# Patient Record
Sex: MALE | Race: OTHER | HISPANIC OR LATINO | Employment: OTHER | URBAN - METROPOLITAN AREA
[De-identification: names, ages, dates, MRNs, and addresses within clinical notes are randomized per-mention and may not be internally consistent; named-entity substitution may affect disease eponyms.]

---

## 2020-11-08 ENCOUNTER — HOSPITAL ENCOUNTER (OUTPATIENT)
Facility: HOSPITAL | Age: 40
Discharge: HOME | End: 2020-11-09
Attending: EMERGENCY MEDICINE | Admitting: FAMILY MEDICINE

## 2020-11-08 DIAGNOSIS — R79.89 POSITIVE D-DIMER: ICD-10-CM

## 2020-11-08 DIAGNOSIS — E11.9 TYPE 2 DIABETES MELLITUS WITHOUT COMPLICATION, WITHOUT LONG-TERM CURRENT USE OF INSULIN: ICD-10-CM

## 2020-11-08 DIAGNOSIS — R07.9 CHEST PAIN, UNSPECIFIED TYPE: ICD-10-CM

## 2020-11-08 DIAGNOSIS — I21.4 NSTEMI (NON-ST ELEVATED MYOCARDIAL INFARCTION): ICD-10-CM

## 2020-11-08 DIAGNOSIS — R79.89 ELEVATED TROPONIN: Primary | ICD-10-CM

## 2020-11-08 DIAGNOSIS — R79.89 POSITIVE D DIMER: ICD-10-CM

## 2020-11-08 DIAGNOSIS — R07.9 CHEST PAIN: ICD-10-CM

## 2020-11-08 LAB
ALBUMIN SERPL BCP-MCNC: 4 G/DL (ref 3.5–5.2)
ALP SERPL-CCNC: 262 U/L (ref 55–135)
ALT SERPL W/O P-5'-P-CCNC: 122 U/L (ref 10–44)
ANION GAP SERPL CALC-SCNC: 14 MMOL/L (ref 8–16)
AST SERPL-CCNC: 200 U/L (ref 10–40)
BILIRUB SERPL-MCNC: 0.9 MG/DL (ref 0.1–1)
BUN SERPL-MCNC: 6 MG/DL (ref 6–20)
CALCIUM SERPL-MCNC: 9.2 MG/DL (ref 8.7–10.5)
CHLORIDE SERPL-SCNC: 103 MMOL/L (ref 95–110)
CO2 SERPL-SCNC: 21 MMOL/L (ref 23–29)
CREAT SERPL-MCNC: 0.9 MG/DL (ref 0.5–1.4)
EST. GFR  (AFRICAN AMERICAN): >60 ML/MIN/1.73 M^2
EST. GFR  (NON AFRICAN AMERICAN): >60 ML/MIN/1.73 M^2
GLUCOSE SERPL-MCNC: 226 MG/DL (ref 70–110)
POTASSIUM SERPL-SCNC: 3 MMOL/L (ref 3.5–5.1)
PROT SERPL-MCNC: 8.2 G/DL (ref 6–8.4)
SODIUM SERPL-SCNC: 138 MMOL/L (ref 136–145)
TROPONIN I SERPL DL<=0.01 NG/ML-MCNC: 0.01 NG/ML (ref 0–0.03)

## 2020-11-08 PROCEDURE — 84484 ASSAY OF TROPONIN QUANT: CPT

## 2020-11-08 PROCEDURE — 99285 EMERGENCY DEPT VISIT HI MDM: CPT | Mod: 25

## 2020-11-08 PROCEDURE — 80053 COMPREHEN METABOLIC PANEL: CPT

## 2020-11-08 PROCEDURE — 93010 ELECTROCARDIOGRAM REPORT: CPT | Mod: ,,, | Performed by: INTERNAL MEDICINE

## 2020-11-08 PROCEDURE — 93005 ELECTROCARDIOGRAM TRACING: CPT

## 2020-11-08 PROCEDURE — 25000003 PHARM REV CODE 250: Performed by: EMERGENCY MEDICINE

## 2020-11-08 PROCEDURE — 85379 FIBRIN DEGRADATION QUANT: CPT

## 2020-11-08 PROCEDURE — 93010 EKG 12-LEAD: ICD-10-PCS | Mod: ,,, | Performed by: INTERNAL MEDICINE

## 2020-11-08 PROCEDURE — 85025 COMPLETE CBC W/AUTO DIFF WBC: CPT

## 2020-11-08 PROCEDURE — 96374 THER/PROPH/DIAG INJ IV PUSH: CPT

## 2020-11-08 RX ORDER — ASPIRIN 325 MG
325 TABLET ORAL
Status: COMPLETED | OUTPATIENT
Start: 2020-11-08 | End: 2020-11-08

## 2020-11-08 RX ORDER — KETOROLAC TROMETHAMINE 30 MG/ML
15 INJECTION, SOLUTION INTRAMUSCULAR; INTRAVENOUS
Status: COMPLETED | OUTPATIENT
Start: 2020-11-08 | End: 2020-11-09

## 2020-11-08 RX ORDER — HYDROCODONE BITARTRATE AND ACETAMINOPHEN 5; 325 MG/1; MG/1
1 TABLET ORAL
Status: COMPLETED | OUTPATIENT
Start: 2020-11-08 | End: 2020-11-09

## 2020-11-08 RX ADMIN — ASPIRIN 325 MG ORAL TABLET 325 MG: 325 PILL ORAL at 11:11

## 2020-11-09 VITALS
DIASTOLIC BLOOD PRESSURE: 91 MMHG | OXYGEN SATURATION: 100 % | TEMPERATURE: 98 F | HEIGHT: 70 IN | BODY MASS INDEX: 27.2 KG/M2 | HEART RATE: 88 BPM | RESPIRATION RATE: 16 BRPM | WEIGHT: 190 LBS | SYSTOLIC BLOOD PRESSURE: 160 MMHG

## 2020-11-09 PROBLEM — R07.9 CHEST PAIN: Status: ACTIVE | Noted: 2020-11-09

## 2020-11-09 PROBLEM — I21.4 NSTEMI (NON-ST ELEVATED MYOCARDIAL INFARCTION): Status: ACTIVE | Noted: 2020-11-09

## 2020-11-09 PROBLEM — R79.89 POSITIVE D DIMER: Status: ACTIVE | Noted: 2020-11-09

## 2020-11-09 PROBLEM — R79.89 ELEVATED TROPONIN: Status: ACTIVE | Noted: 2020-11-09

## 2020-11-09 PROBLEM — E11.9 TYPE 2 DIABETES MELLITUS WITHOUT COMPLICATION, WITHOUT LONG-TERM CURRENT USE OF INSULIN: Status: ACTIVE | Noted: 2020-11-09

## 2020-11-09 LAB
ANISOCYTOSIS BLD QL SMEAR: SLIGHT
BASOPHILS # BLD AUTO: 0.05 K/UL (ref 0–0.2)
BASOPHILS NFR BLD: 0.6 % (ref 0–1.9)
CTP QC/QA: YES
D DIMER PPP IA.FEU-MCNC: 0.64 MG/L FEU
DIFFERENTIAL METHOD: ABNORMAL
EOSINOPHIL # BLD AUTO: 0.2 K/UL (ref 0–0.5)
EOSINOPHIL NFR BLD: 2.3 % (ref 0–8)
ERYTHROCYTE [DISTWIDTH] IN BLOOD BY AUTOMATED COUNT: 12.9 % (ref 11.5–14.5)
ESTIMATED AVG GLUCOSE: 171 MG/DL (ref 68–131)
ESTIMATED AVG GLUCOSE: 171 MG/DL (ref 68–131)
ETHANOL SERPL-MCNC: <10 MG/DL
GIANT PLATELETS BLD QL SMEAR: PRESENT
HBA1C MFR BLD HPLC: 7.6 % (ref 4–5.6)
HBA1C MFR BLD HPLC: 7.6 % (ref 4–5.6)
HCT VFR BLD AUTO: 39.7 % (ref 40–54)
HGB BLD-MCNC: 13.8 G/DL (ref 14–18)
HYPOCHROMIA BLD QL SMEAR: ABNORMAL
IMM GRANULOCYTES # BLD AUTO: 0.03 K/UL (ref 0–0.04)
IMM GRANULOCYTES NFR BLD AUTO: 0.4 % (ref 0–0.5)
LYMPHOCYTES # BLD AUTO: 3.2 K/UL (ref 1–4.8)
LYMPHOCYTES NFR BLD: 39 % (ref 18–48)
MAGNESIUM SERPL-MCNC: 1.7 MG/DL (ref 1.6–2.6)
MCH RBC QN AUTO: 30.3 PG (ref 27–31)
MCHC RBC AUTO-ENTMCNC: 34.8 G/DL (ref 32–36)
MCV RBC AUTO: 87 FL (ref 82–98)
MONOCYTES # BLD AUTO: 1.2 K/UL (ref 0.3–1)
MONOCYTES NFR BLD: 14.4 % (ref 4–15)
NEUTROPHILS # BLD AUTO: 3.6 K/UL (ref 1.8–7.7)
NEUTROPHILS NFR BLD: 43.3 % (ref 38–73)
NRBC BLD-RTO: 0 /100 WBC
OVALOCYTES BLD QL SMEAR: ABNORMAL
PHOSPHATE SERPL-MCNC: 3 MG/DL (ref 2.7–4.5)
PLATELET # BLD AUTO: 120 K/UL (ref 150–350)
PLATELET BLD QL SMEAR: ABNORMAL
PMV BLD AUTO: 11.2 FL (ref 9.2–12.9)
POIKILOCYTOSIS BLD QL SMEAR: SLIGHT
RBC # BLD AUTO: 4.55 M/UL (ref 4.6–6.2)
SARS-COV-2 RDRP RESP QL NAA+PROBE: NEGATIVE
TROPONIN I SERPL DL<=0.01 NG/ML-MCNC: 0.02 NG/ML (ref 0–0.03)
TROPONIN I SERPL DL<=0.01 NG/ML-MCNC: 0.03 NG/ML (ref 0–0.03)
TSH SERPL DL<=0.005 MIU/L-ACNC: 1 UIU/ML (ref 0.4–4)
WBC # BLD AUTO: 8.28 K/UL (ref 3.9–12.7)

## 2020-11-09 PROCEDURE — 25000003 PHARM REV CODE 250: Performed by: EMERGENCY MEDICINE

## 2020-11-09 PROCEDURE — 93005 ELECTROCARDIOGRAM TRACING: CPT

## 2020-11-09 PROCEDURE — G0378 HOSPITAL OBSERVATION PER HR: HCPCS

## 2020-11-09 PROCEDURE — 83036 HEMOGLOBIN GLYCOSYLATED A1C: CPT

## 2020-11-09 PROCEDURE — 84100 ASSAY OF PHOSPHORUS: CPT

## 2020-11-09 PROCEDURE — 83735 ASSAY OF MAGNESIUM: CPT

## 2020-11-09 PROCEDURE — 25500020 PHARM REV CODE 255: Performed by: EMERGENCY MEDICINE

## 2020-11-09 PROCEDURE — 80320 DRUG SCREEN QUANTALCOHOLS: CPT

## 2020-11-09 PROCEDURE — 84484 ASSAY OF TROPONIN QUANT: CPT

## 2020-11-09 PROCEDURE — 25000003 PHARM REV CODE 250: Performed by: STUDENT IN AN ORGANIZED HEALTH CARE EDUCATION/TRAINING PROGRAM

## 2020-11-09 PROCEDURE — 84443 ASSAY THYROID STIM HORMONE: CPT

## 2020-11-09 PROCEDURE — 84484 ASSAY OF TROPONIN QUANT: CPT | Mod: 91

## 2020-11-09 PROCEDURE — U0002 COVID-19 LAB TEST NON-CDC: HCPCS | Performed by: EMERGENCY MEDICINE

## 2020-11-09 PROCEDURE — 63600175 PHARM REV CODE 636 W HCPCS: Performed by: EMERGENCY MEDICINE

## 2020-11-09 RX ORDER — ONDANSETRON 8 MG/1
8 TABLET, ORALLY DISINTEGRATING ORAL EVERY 6 HOURS PRN
Status: DISCONTINUED | OUTPATIENT
Start: 2020-11-09 | End: 2020-11-09 | Stop reason: HOSPADM

## 2020-11-09 RX ORDER — IBUPROFEN 200 MG
24 TABLET ORAL
Status: DISCONTINUED | OUTPATIENT
Start: 2020-11-09 | End: 2020-11-09 | Stop reason: HOSPADM

## 2020-11-09 RX ORDER — METFORMIN HYDROCHLORIDE 500 MG/1
500 TABLET ORAL
Qty: 90 TABLET | Refills: 0 | Status: SHIPPED | OUTPATIENT
Start: 2020-11-09 | End: 2021-02-07

## 2020-11-09 RX ORDER — ACETAMINOPHEN 325 MG/1
650 TABLET ORAL EVERY 4 HOURS PRN
Status: DISCONTINUED | OUTPATIENT
Start: 2020-11-09 | End: 2020-11-09 | Stop reason: HOSPADM

## 2020-11-09 RX ORDER — IBUPROFEN 200 MG
16 TABLET ORAL
Status: DISCONTINUED | OUTPATIENT
Start: 2020-11-09 | End: 2020-11-09 | Stop reason: HOSPADM

## 2020-11-09 RX ORDER — GLUCAGON 1 MG
1 KIT INJECTION
Status: DISCONTINUED | OUTPATIENT
Start: 2020-11-09 | End: 2020-11-09 | Stop reason: HOSPADM

## 2020-11-09 RX ORDER — SODIUM CHLORIDE 0.9 % (FLUSH) 0.9 %
5 SYRINGE (ML) INJECTION
Status: DISCONTINUED | OUTPATIENT
Start: 2020-11-09 | End: 2020-11-09 | Stop reason: HOSPADM

## 2020-11-09 RX ORDER — POTASSIUM CHLORIDE 20 MEQ/1
40 TABLET, EXTENDED RELEASE ORAL EVERY 4 HOURS
Status: DISCONTINUED | OUTPATIENT
Start: 2020-11-09 | End: 2020-11-09 | Stop reason: HOSPADM

## 2020-11-09 RX ADMIN — POTASSIUM CHLORIDE 40 MEQ: 1500 TABLET, EXTENDED RELEASE ORAL at 09:11

## 2020-11-09 RX ADMIN — HYDROCODONE BITARTRATE AND ACETAMINOPHEN 1 TABLET: 5; 325 TABLET ORAL at 12:11

## 2020-11-09 RX ADMIN — KETOROLAC TROMETHAMINE 15 MG: 30 INJECTION, SOLUTION INTRAMUSCULAR at 12:11

## 2020-11-09 RX ADMIN — IOHEXOL 100 ML: 350 INJECTION, SOLUTION INTRAVENOUS at 05:11

## 2020-11-09 NOTE — PROVIDER PROGRESS NOTES - EMERGENCY DEPT.
Encounter Date: 2020    ED Physician Progress Notes       SCRIBE NOTE: I, Eva Altamirano, am scribing for, and in the presence of,  Dr. Oglesby .  Physician Statement: I, Dr. Oglesby , personally performed the services described in this documentation as scribed by Eva Altamirano in my presence, and it is both accurate and complete.     Physician Note:   Patient signed out to me at shift change from Dr. Gomez. Plan is to admit patient for positive D-dimer and elevated troponin. Will obtain EKG and continue to monitor.     EK: NSR at a rate of 80 bpm; No ST/T wave changes; Isolated Q-wave in lead III    EKG - STEMI Decision  Initial Reading: No STEMI present.

## 2020-11-09 NOTE — HOSPITAL COURSE
Pt admitted to Family Medicine service for ACS r/o. Troponin 0.008 -> 0.034 --> 0.02, EKG w/ NSR, likely 2/2 cocaine use. D-dimer 0.64, CTA negative, US BLE negative. AST//122, alk phos 262, abdominal US w/ coarsened echotexture of the liver parenchyma, likely 2/2 alcohol use. Pt additionally found to have new-onset DMT2 w/ A1c 7.6, discussed with patient, plan to start 500 mg daily on discharge. Counseled on cessation of alcohol and recreational drug use. Pt stable for discharge with plan for close follow-up with [facility]. Pt agreeable to plan, expressed understanding. Close return precautions given.

## 2020-11-09 NOTE — ED NOTES
Pt presents to ED with c/o L sided chest pain with radiation to L shoulder that began 1 hr pta. Pt states pain is worse with movement. Pt denies n/v, SOB.    Patient identifiers for Minh Aguirre verified by spelling and stated name on armband along with .     Review of patient's allergies indicates:  No Known Allergies     APPEARANCE: Alert, oriented and in no acute distress.  CARDIAC: Normal rate and rhythm, no murmur heard. + L sided chest pain  PERIPHERAL VASCULAR: peripheral pulses present. Normal cap refill. No edema. Warm to touch.    RESPIRATORY:Respirations are equal and unlabored no obvious signs of distress.  GASTRO: soft, bowel sounds normal, no tenderness, no abdominal distention.  MUSC: Full ROM. No bony tenderness or soft tissue tenderness. No obvious deformity.  SKIN: Skin is warm and dry, normal skin turgor, mucous membranes moist.  MENTAL STATUS: awake, alert and aware of environment.      Patient verbalized understanding of status and plan of care. Patient changed into hospital gown with assistance.   Patient side rails are up x 2, bed is low and locked, call light is in reach.  Cardiac monitor (alarms on, set, and audible), pulse oximeter, and automatic blood pressure cuff applied.   Will continue to monitor.

## 2020-11-09 NOTE — ED NOTES
Admit team reports patient is ready to be discharged at this time. After final evaluation, pt verbalizes understanding of discharge at this time.

## 2020-11-09 NOTE — H&P
History & Physical  [facility]      SUBJECTIVE:     History of Present Illness:  41 yo M with no PMHx presents to [facility] on 11/9/20 for evaluation of chest pain. Patient reports constant, dull, left-sided chest pain radiating to the shoulder x1 hour prior to presenting to ED. Onset of pain associated with drinking (pt reports drinking 18 beers every 1-3 days) and using cocaine. Associated symptoms included palpitations. Pt denies associated headache, dizziness, lightheadedness, SOB, abdominal pain, N/V, constipation, diarrhea. Pt states he has never had pain like this before. At time of examination, pt reports complete resolution of symptoms. Pain resolved after administration of ASA, Norco 5, and Toradol in ED. Pt denies any medical history, surgical history, family history. NKDA.    In the ED, VSS w/ BP elevated to 164/90. Troponin elevated 0.034. EKG w/ NSR. D-dimer elevated 0.64, CTA negative. Family Medicine consulted for admission for ACS r/o.     Review of patient's allergies indicates:  No Known Allergies    No past medical history on file.     No past surgical history on file.     No family history on file.     Social History     Tobacco Use    Smoking status: Not on file   Substance Use Topics    Alcohol use: Not on file    Drug use: Not on file      Review of Systems   Constitutional: Negative for chills, fever and malaise/fatigue.   HENT: Negative for congestion, sinus pain and sore throat.    Respiratory: Negative for cough and shortness of breath.    Cardiovascular: Negative for chest pain, palpitations and leg swelling.   Gastrointestinal: Negative for abdominal pain, constipation, diarrhea, nausea and vomiting.   Genitourinary: Negative for dysuria, flank pain, frequency, hematuria and urgency.   Musculoskeletal: Negative for back pain, joint pain, myalgias and neck pain.   Neurological: Negative for dizziness, tremors, focal weakness, loss of consciousness, weakness and headaches.    Psychiatric/Behavioral: Positive for substance abuse.     OBJECTIVE:     Vital Signs (Most Recent)  Temp: 98.2 °F (36.8 °C) (11/09/20 0701)  Pulse: 86 (11/09/20 0920)  Resp: 15 (11/09/20 0920)  BP: (!) 155/91 (11/09/20 0920)  SpO2: 99 % (11/09/20 0920)    Physical Exam   Constitutional: He is oriented to person, place, and time and well-developed, well-nourished, and in no distress. No distress.   HENT:   Head: Normocephalic and atraumatic.   Neck: Normal range of motion. Neck supple.   Cardiovascular: Normal rate, regular rhythm, normal heart sounds and intact distal pulses.   Pulmonary/Chest: Effort normal and breath sounds normal. No respiratory distress.   Abdominal: Soft. Bowel sounds are normal. He exhibits no distension. There is no abdominal tenderness.   Musculoskeletal: Normal range of motion.   Neurological: He is alert and oriented to person, place, and time. No cranial nerve deficit.   Skin: Skin is warm and dry. He is not diaphoretic.   Nursing note and vitals reviewed.    Recent Labs   Lab 11/08/20  2320   WBC 8.28   RBC 4.55*   HGB 13.8*   HCT 39.7*   *   MCV 87   MCH 30.3   MCHC 34.8     Recent Labs   Lab 11/08/20  2320      K 3.0*   CO2 21*      BUN 6   CREATININE 0.9     Recent Labs   Lab 11/08/20  2320   CALCIUM 9.2     Recent Labs   Lab 11/08/20  2320   PROT 8.2   ALBUMIN 4.0   BILITOT 0.9   *   ALKPHOS 262*   *     Recent Labs   Lab 11/08/20  2320 11/09/20  0158 11/09/20  0828   TROPONINI 0.008 0.034* 0.021     Lab Results   Component Value Date    HGBA1C 7.6 (H) 11/09/2020    HGBA1C 7.6 (H) 11/09/2020     Imaging Results          US Abdomen Limited (Final result)  Result time 11/09/20 09:16:02    Final result by Blayne Salomon MD (11/09/20 09:16:02)                 Impression:      1. No acute sonographic findings.  No evidence of cholelithiasis or acute cholecystitis.  2. Suggested coarsened echotexture of the liver parenchyma.  Recommend correlation  with LFTs.      Electronically signed by: Blayne Salomon  Date:    11/09/2020  Time:    09:16             Narrative:    EXAMINATION:  US ABDOMEN LIMITED    CLINICAL HISTORY:  Transaminitis;    TECHNIQUE:  Limited ultrasound of the right upper quadrant of the abdomen (including pancreas, liver, gallbladder, common bile duct, and spleen) was performed.    COMPARISON:  None.    FINDINGS:  Liver: Normal in size, measuring 14.6 cm. Suggestion of coarsened echotexture.  No focal hepatic lesions.    Gallbladder: No calculi, wall thickening, or pericholecystic fluid.  No sonographic Rico's sign.    Biliary system: The common duct is not dilated, measuring 2.5 mm.  No intrahepatic ductal dilatation.    Spleen: Normal in size and echotexture, measuring 14.8 (sagittal) by 4.5 (AP) cm.    Miscellaneous: No upper abdominal ascites.                               US Lower Extremity Veins Bilateral (Final result)  Result time 11/09/20 09:11:49    Final result by Blayne Salomon MD (11/09/20 09:11:49)                 Impression:      No evidence of deep venous thrombosis in either lower extremity.      Electronically signed by: Blayne Salomon  Date:    11/09/2020  Time:    09:11             Narrative:    EXAMINATION:  US LOWER EXTREMITY VEINS BILATERAL    CLINICAL HISTORY:  elevated D-dimer; Other specified abnormal findings of blood chemistry    TECHNIQUE:  Duplex and color flow Doppler and dynamic compression was performed of the bilateral lower extremity veins was performed.    COMPARISON:  None    FINDINGS:  Right thigh veins: The common femoral, femoral, popliteal, upper greater saphenous, and deep femoral veins are patent and free of thrombus. The veins are normally compressible and have normal phasic flow and augmentation response.    Right calf veins: The visualized calf veins are patent.    Left thigh veins: The common femoral, femoral, popliteal, upper greater saphenous, and deep femoral veins are patent and free of  thrombus. The veins are normally compressible and have normal phasic flow and augmentation response.    Left calf veins: The visualized calf veins are patent.    Miscellaneous: None                               CTA Chest Non-Coronary (PE Study) (Final result)  Result time 11/09/20 05:28:28    Final result by Ozzie Fu MD (11/09/20 05:28:28)                 Impression:      Limited assessment secondary to suboptimal contrast bolus timing and significant respiratory motion.  No evidence of central pulmonary embolus or convincing evidence of pulmonary thromboembolism to the proximal segmental levels or pulmonary infarction. Pulmonary embolus distal to the proximal segmental levels, particularly at the lung bases cannot be excluded. Correlation with d-dimer and lower extremity venous Doppler ultrasound as clinically warranted.      Electronically signed by: Ozzie Fu MD  Date:    11/09/2020  Time:    05:28             Narrative:    EXAMINATION:  CTA CHEST NON CORONARY    CLINICAL HISTORY:  PE suspected, intermediate prob, positive D-dimer;    TECHNIQUE:  Low dose axial images, sagittal and coronal reformations were obtained from the thoracic inlet to the lung bases following the IV administration of 100 mL of Omnipaque 350.  Contrast timing was optimized to evaluate the pulmonary arteries.  MIP images were performed.    COMPARISON:  Chest radiograph 11/08/2020    FINDINGS:  The visualized soft tissue structures at the base of the neck are unremarkable.    The thoracic aorta maintains normal caliber, contour, and course without significant atherosclerotic calcification within its course.  There is no evidence of aneurysmal dilation or dissection. The heart is not enlarged and there is no evidence of pericardial effusion.The esophagus maintains a normal course and caliber.There are few scattered nonenlarged subcentimeter mediastinal lymph nodes.  There are small calcified left hilar lymph nodes.    The  trachea is midline and proximal airways are patent. Detailed assessment of the lung parenchyma is limited by respiratory motion.  There is no pneumothorax or confluent airspace consolidation.  There is a calcified left lower lobe granuloma.  There is no significant pleural effusion.    There is suboptimal opacification of the pulmonary arteries.  There is no convincing evidence of pulmonary thromboembolism to the level of the proximal segmental arteries.  Evaluation more distally, particularly at the lung bases is limited by respiratory motion and contrast bolus timing.    Limited images of the upper abdomen obtained during the course of this dedicated thoracic CT demonstrate no acute abnormalities.    The osseous structures demonstrate mild degenerative changes of the thoracic spine.                               X-Ray Chest AP Portable (Final result)  Result time 11/08/20 23:52:15    Final result by Mark Oglesby DO (11/08/20 23:52:15)                 Impression:      No acute abnormality.      Electronically signed by: Mark Oglesby  Date:    11/08/2020  Time:    23:52             Narrative:    EXAMINATION:  XR CHEST AP PORTABLE    CLINICAL HISTORY:  pain.    TECHNIQUE:  Single frontal view of the chest was performed.    COMPARISON:  None    FINDINGS:  The lungs are well expanded and clear. No focal opacities are seen. The pleural spaces are clear.  The cardiac silhouette is unremarkable.  The visualized osseous structures are unremarkable.                                ASSESSMENT/PLAN:     41 yo M with no PMHx presents to [facility] on 11/9/20 for evaluation of chest pain after cocaine use.     Elevated troponin, resolved:   - Likely 2/2 cocaine use  - Troponin 0.008 -> 0.034 --> 0.021  - EKG w/ NSR     Elevated d-dimer:   - D-dimer 0.64  - CTA negative  - US BLE negative    Transaminitis:   - Likely 2/2 alcohol use  - AST//122, alk phos 262  - Abdominal US w/ coarsened echotexture of the liver  parenchyma    DMT2, new-onset:   - Last A1c of 7.6,  on admisison  - Low dose SSI while inpatient,  BG goal of 140-180  - Plan to start metformin 500 mg daily on discharge    Diet: Diabetic  Code: Full  PPx: SCDs     Case discussed w/ attending physician, Dr. Alcala.     Lin Dela Cruz MD  [facility] PGY-2  11/09/2020

## 2020-11-09 NOTE — ED NOTES
Admit team at bedside. Pt has breakfast. All vitals signs stable. Pt in no apparent distress. Will continue to monitor.

## 2020-11-09 NOTE — DISCHARGE SUMMARY
[facility] Hospital Medicine  Discharge Summary      Patient Name: Minh Aguirre  MRN: [id number]  Admission Date: 11/8/2020  Hospital Length of Stay: 0 days  Discharge Date and Time:  11/09/2020 10:22 AM  Attending Physician: Remedios att. providers found   Discharging Provider: Lin Dela Cruz MD  Primary Care Provider: Primary Doctor Remedios      HPI:   41 yo M with no PMHx presents to [facility] on 11/9/20 for evaluation of chest pain. Patient reports constant, dull, left-sided chest pain radiating to the shoulder x1 hour prior to presenting to ED. Onset of pain associated with drinking (pt reports drinking 18 beers every 1-3 days) and using cocaine. Associated symptoms included palpitations. Pt denies associated headache, dizziness, lightheadedness, SOB, abdominal pain, N/V, constipation, diarrhea. Pt states he has never had pain like this before. At time of examination, pt reports complete resolution of symptoms. Pain resolved after administration of ASA, Norco 5, and Toradol in ED. Pt denies any medical history, surgical history, family history. NKDA.     In the ED, VSS w/ BP elevated to 164/90. Troponin elevated 0.034. EKG w/ NSR. D-dimer elevated 0.64, CTA negative. Family Medicine consulted for admission for ACS r/o.     Hospital Course:   Pt admitted to Family Medicine service for ACS r/o. Troponin 0.008 -> 0.034 --> 0.02, EKG w/ NSR, likely 2/2 cocaine use. D-dimer 0.64, CTA negative, US BLE negative. AST//122, alk phos 262, abdominal US w/ coarsened echotexture of the liver parenchyma, likely 2/2 alcohol use. Pt additionally found to have new-onset DMT2 w/ A1c 7.6, discussed with patient, plan to start 500 mg daily on discharge. Counseled on cessation of alcohol and recreational drug use. Pt stable for discharge with plan for close follow-up with [facility]. Pt agreeable to plan, expressed understanding. Close return precautions given.      Consults (From  admission, onward)        Status Ordering Provider     Case Management  Once     Provider:  (Not yet assigned)    Ordered PRIMITIVO MTOTA        Final Active Diagnoses:    Diagnosis Date Noted POA    PRINCIPAL PROBLEM:  Elevated troponin [R77.8] 11/09/2020 Yes    NSTEMI (non-ST elevated myocardial infarction) [I21.4] 11/09/2020 Yes    Chest pain [R07.9] 11/09/2020 Yes    Type 2 diabetes mellitus without complication, without long-term current use of insulin [E11.9] 11/09/2020 Yes    Positive D dimer [R79.89] 11/09/2020 Yes      Problems Resolved During this Admission:     Discharged Condition: good, stable    Disposition: Home or Self Care    Follow-up Information     Chao Pierre PA-C In 1 week.    Specialty: Family Medicine  Why: Follow up hospital admission, new-onset DMT2  Contact information:  [address]  [address]  [facility] [id number] [phone]                 Patient Instructions:      Diet diabetic     Notify your health care provider if you experience any of the following:  persistent nausea and vomiting or diarrhea     Notify your health care provider if you experience any of the following:  difficulty breathing or increased cough     Notify your health care provider if you experience any of the following:  persistent dizziness, light-headedness, or visual disturbances     Notify your health care provider if you experience any of the following:  increased confusion or weakness     Activity as tolerated     Significant Diagnostic Studies:   Recent Labs   Lab 11/08/20  2320 11/09/20  0828   *  --      --    K 3.0*  --      --    CO2 21*  --    BUN 6  --    CREATININE 0.9  --    CALCIUM 9.2  --    MG  --  1.7     Recent Labs   Lab 11/08/20  2320      K 3.0*      CO2 21*   *   BUN 6   CREATININE 0.9   CALCIUM 9.2   PROT 8.2   ALBUMIN 4.0   BILITOT 0.9   ALKPHOS 262*   *   *   ANIONGAP 14   ESTGFRAFRICA >60   EGFRNONAA >60     Recent Labs    Lab 11/08/20 2320   WBC 8.28   HGB 13.8*   HCT 39.7*   *     Recent Labs   Lab 11/08/20  2320 11/09/20  0158 11/09/20 0828   TROPONINI 0.008 0.034* 0.021     Recent Labs   Lab 11/09/20 0828   HGBA1C 7.6*  7.6*     Pending Diagnostic Studies:     Procedure Component Value Units Date/Time    TSH [[id number]] Collected: 11/09/20 0828    Order Status: Sent Lab Status: In process Updated: 11/09/20 0835    Specimen: Blood          Medications:     Medication List      START taking these medications    metFORMIN 500 MG tablet  Commonly known as: GLUCOPHAGE  [place] robert tableta (500 mg en total) por vía oral diariamente con el desayuno.  (Take 1 tablet (500 mg total) by mouth daily with breakfast.)          Indwelling Lines/Drains at time of discharge:   Lines/Drains/Airways     None                 Time spent on the discharge of patient: 30 minutes  Patient was seen and examined on the date of discharge and determined to be suitable for discharge.         Lin Dela Cruz MD  Department of Hospital Medicine  [facility]

## 2020-11-09 NOTE — ED NOTES
Pt denies any chest pain. Pt in no apparent distress. Vital signs are stable at this time. Pt reports unable to urinate at this time. Will continue to monitor.

## 2020-11-09 NOTE — ED PROVIDER NOTES
Encounter Date: 11/8/2020    SCRIBE #1 NOTE: I, Tamanna Ignacia, am scribing for, and in the presence of, Dr. Cortes.     I, Dr. Mabel Cortes MD, personally performed the services described in this documentation. All medical record entries made by the scribe were at my direction and in my presence.  I have reviewed the chart and agree that the record reflects my personal performance and is accurate and complete. Mabel Cortes MD.    History     Chief Complaint   Patient presents with    Chest Pain     Pt. c/o left sided chest pain that radiates to the left shoulder that began one hour PTA. Pt. states the pain is worse with movements. Pt. denies N/V. Denies shortness of breath. Skin is PWD.      CHIEF COMPLAINT: Patient presents with: chest pain      HISTORY OF PRESENT ILLNESS: Minh Aguirre who is a 40 y.o. presents to the emergency department today with complaint of chest pain. Onset began about 1 hour ago with associated palpitations. Pain radiates toward the left shoulder. Patient was drinking beer when chest pain began. He denies any trauma or injury to the area. Patient denies SOB, nausea, diaphoresis, nausea, vomiting, diarrhea or any other symptoms at this time. Patient deneis any recent travel. Patient denies any known sick contacts or any contacts with Covid-19. Patient denies medical issues and does not take medication regularly.         ALLERGIES REVIEWED  MEDICATIONS REVIEWED  PMH/PSH/SOC/FH REVIEWED     The history is provided by the patient.    Nursing/Ancillary staff note reviewed.    The history is provided by the patient.     Review of patient's allergies indicates:  No Known Allergies  No past medical history on file.  No past surgical history on file.  No family history on file.  Social History     Tobacco Use    Smoking status: Not on file   Substance Use Topics    Alcohol use: Not on file    Drug use: Not on file     Review of Systems   Constitutional: Negative for activity  change, chills, diaphoresis and fever.   HENT: Negative for congestion, drooling, ear pain, rhinorrhea, sneezing, sore throat and trouble swallowing.    Eyes: Negative for pain.   Respiratory: Negative for cough, chest tightness, shortness of breath, wheezing and stridor.    Cardiovascular: Positive for chest pain. Negative for palpitations and leg swelling.   Gastrointestinal: Negative for abdominal distention, abdominal pain, constipation, diarrhea, nausea and vomiting.   Genitourinary: Negative for difficulty urinating, dysuria, frequency and urgency.   Musculoskeletal: Negative for arthralgias, back pain, myalgias, neck pain and neck stiffness.   Skin: Negative for pallor, rash and wound.   Neurological: Negative for dizziness, syncope, weakness, light-headedness, numbness and headaches.   All other systems reviewed and are negative.      Physical Exam     Initial Vitals [11/08/20 2313]   BP Pulse Resp Temp SpO2   (!) 164/90 110 18 98.4 °F (36.9 °C) 97 %      MAP       --         Physical Exam    Nursing note and vitals reviewed.  Constitutional: He appears well-developed and well-nourished. He is not diaphoretic. No distress.   HENT:   Head: Normocephalic and atraumatic.   Nose: Nose normal.   Mouth/Throat: Oropharynx is clear and moist.   Eyes: Conjunctivae and EOM are normal. Pupils are equal, round, and reactive to light. No scleral icterus.   Neck: Normal range of motion. Neck supple. No JVD present.   Cardiovascular: Regular rhythm and normal heart sounds. Tachycardia present.  Exam reveals no gallop and no friction rub.    No murmur heard.  Pulmonary/Chest: Breath sounds normal. No stridor. No respiratory distress. He has no wheezes. He exhibits no tenderness.   Abdominal: Soft. Bowel sounds are normal. He exhibits no distension and no mass. There is no abdominal tenderness. There is no rebound and no guarding.   Musculoskeletal: Normal range of motion. No tenderness or edema.      Cervical back: Normal.       Thoracic back: Normal.      Lumbar back: Normal.   Lymphadenopathy:     He has no cervical adenopathy.   Neurological: He is alert and oriented to person, place, and time. He has normal strength. No cranial nerve deficit.   Skin: Skin is warm and dry. No rash noted. No pallor.   Psychiatric: He has a normal mood and affect. Thought content normal.         ED Course   Procedures  Labs Reviewed   CBC W/ AUTO DIFFERENTIAL - Abnormal; Notable for the following components:       Result Value    RBC 4.55 (*)     Hemoglobin 13.8 (*)     Hematocrit 39.7 (*)     Platelets 120 (*)     Mono # 1.2 (*)     All other components within normal limits   COMPREHENSIVE METABOLIC PANEL - Abnormal; Notable for the following components:    Potassium 3.0 (*)     CO2 21 (*)     Glucose 226 (*)     Alkaline Phosphatase 262 (*)      (*)      (*)     All other components within normal limits   D DIMER, QUANTITATIVE - Abnormal; Notable for the following components:    D-Dimer 0.64 (*)     All other components within normal limits   TROPONIN I - Abnormal; Notable for the following components:    Troponin I 0.034 (*)     All other components within normal limits   TROPONIN I   SARS-COV-2 RDRP GENE     EKG Readings: (Independently Interpreted)   Rate: 117 bpm. Sin tachycardia. S1 Q3. Right axis deviation. No STEMI        Imaging Results          X-Ray Chest AP Portable (Final result)  Result time 11/08/20 23:52:15    Final result by Mark Oglesby DO (11/08/20 23:52:15)                 Impression:      No acute abnormality.      Electronically signed by: Mark Oglesby  Date:    11/08/2020  Time:    23:52             Narrative:    EXAMINATION:  XR CHEST AP PORTABLE    CLINICAL HISTORY:  pain.    TECHNIQUE:  Single frontal view of the chest was performed.    COMPARISON:  None    FINDINGS:  The lungs are well expanded and clear. No focal opacities are seen. The pleural spaces are clear.  The cardiac silhouette is unremarkable.   The visualized osseous structures are unremarkable.                              X-Rays:   Independently Interpreted Readings:   Chest X-Ray: Reviewed by myself, read by radiology.      Medical Decision Making:   History:   Old Medical Records: I decided to obtain old medical records.  Old Records Summarized: other records.       <> Summary of Records: No previous medical records in system.   Initial Assessment:   Minh Aguirre 40 y.o. presents to the ED today with chest pain. It occurred suddenly and at rest. He has tachycardia, shortness of breath, will obtain EKG, troponin d-dimer, CXR for further evaluation. If d-dimer is elevated will obtain CTA of the chest.    Differential Diagnosis:   Myocardial ischemia, pericarditis, pulmonary embolus, chest wall pain, pleural inflammation, pulmonary infectious causes, aortic dissection.   Independently Interpreted Test(s):   I have ordered and independently interpreted X-rays - see prior notes.  I have ordered and independently interpreted EKG Reading(s) - see prior notes  Clinical Tests:   Lab Tests: Ordered and Reviewed  Radiological Study: Ordered and Reviewed  Medical Tests: Ordered and Reviewed  ED Management:  This is a 39 yo  male who presents to the ED with chest pain that started at rest. Denies injury or trauma. He has associated shortness of breath. H ewas tachycardic on exam. EKG with S1Q3. Pt feeling improved following meds but awaiting d-dimer for further evaluation. Initial troponin normal. At 0100 the pt was turned over to Dr Gomez at shift change awaiting results of 2nd troponin and D-dimer. If d-terry elevated plan is to obtain CTA to further evaluate for PE. Pts presentation and workup discussed with Dr Gomez. He will make the final disposition based off labs and response to treatment. The pt was stable upon turnover and my leaving the emergency department at 0100.                              Clinical Impression:        ICD-10-CM ICD-9-CM   1. Chest pain  R07.9 786.50                                               Mabel Cortes MD  11/09/20 0427

## 2020-11-09 NOTE — ED NOTES
Pt being transported to ultrasound at this time with tech. Pt in no apparent distress. All vitals stable at this time.